# Patient Record
Sex: FEMALE | Race: ASIAN | Employment: FULL TIME | URBAN - METROPOLITAN AREA
[De-identification: names, ages, dates, MRNs, and addresses within clinical notes are randomized per-mention and may not be internally consistent; named-entity substitution may affect disease eponyms.]

---

## 2019-01-17 ENCOUNTER — HOSPITAL ENCOUNTER (OUTPATIENT)
Dept: MAMMOGRAPHY | Age: 44
Discharge: HOME | End: 2019-01-17
Attending: SPECIALIST
Payer: COMMERCIAL

## 2019-01-17 DIAGNOSIS — Z12.39 ENCOUNTER FOR SCREENING FOR MALIGNANT NEOPLASM OF BREAST: ICD-10-CM

## 2019-01-17 PROCEDURE — 77067 SCR MAMMO BI INCL CAD: CPT | Performed by: SPECIALIST

## 2019-01-17 PROCEDURE — 77063 BREAST TOMOSYNTHESIS BI: CPT | Performed by: SPECIALIST

## 2019-02-21 ENCOUNTER — OFFICE VISIT (OUTPATIENT)
Dept: NEUROLOGY | Facility: CLINIC | Age: 44
End: 2019-02-21

## 2019-02-21 VITALS
WEIGHT: 172 LBS | RESPIRATION RATE: 16 BRPM | HEIGHT: 65 IN | DIASTOLIC BLOOD PRESSURE: 74 MMHG | BODY MASS INDEX: 28.66 KG/M2 | HEART RATE: 78 BPM | SYSTOLIC BLOOD PRESSURE: 122 MMHG | OXYGEN SATURATION: 99 %

## 2019-02-21 DIAGNOSIS — R42 DIZZINESS: ICD-10-CM

## 2019-02-21 DIAGNOSIS — M54.2 NECK PAIN ON RIGHT SIDE: Primary | ICD-10-CM

## 2019-02-21 DIAGNOSIS — M62.838 CERVICAL PARASPINAL MUSCLE SPASM: ICD-10-CM

## 2019-02-21 DIAGNOSIS — G44.86 CERVICOGENIC HEADACHE: ICD-10-CM

## 2019-02-21 PROCEDURE — 99244 OFF/OP CNSLTJ NEW/EST MOD 40: CPT | Performed by: OTHER

## 2019-02-21 RX ORDER — ERGOCALCIFEROL 1.25 MG/1
1 CAPSULE ORAL WEEKLY
Refills: 1 | COMMUNITY
Start: 2019-01-24

## 2019-02-21 RX ORDER — CYCLOBENZAPRINE HCL 5 MG
5 TABLET ORAL 3 TIMES DAILY PRN
Qty: 90 TABLET | Refills: 0 | Status: SHIPPED | OUTPATIENT
Start: 2019-02-21

## 2019-02-21 RX ORDER — FLUTICASONE PROPIONATE 50 MCG
1 SPRAY, SUSPENSION (ML) NASAL DAILY
Refills: 0 | COMMUNITY
Start: 2019-02-18

## 2019-02-21 RX ORDER — LOSARTAN POTASSIUM AND HYDROCHLOROTHIAZIDE 12.5; 5 MG/1; MG/1
1 TABLET ORAL DAILY
Refills: 1 | COMMUNITY
Start: 2018-12-13

## 2019-02-21 RX ORDER — ONDANSETRON 4 MG/1
1 TABLET, ORALLY DISINTEGRATING ORAL EVERY 8 HOURS
Refills: 0 | COMMUNITY
Start: 2019-02-18

## 2019-02-21 RX ORDER — MECLIZINE HCL 12.5 MG/1
1 TABLET ORAL 2 TIMES DAILY
Refills: 0 | COMMUNITY
Start: 2019-02-19

## 2019-02-21 RX ORDER — METOPROLOL SUCCINATE 25 MG/1
1 TABLET, EXTENDED RELEASE ORAL DAILY
Refills: 1 | COMMUNITY
Start: 2018-09-15

## 2019-02-21 RX ORDER — LEVOTHYROXINE SODIUM 0.05 MG/1
1 TABLET ORAL DAILY
Refills: 1 | COMMUNITY
Start: 2019-02-09

## 2019-02-21 RX ORDER — AMOXICILLIN 875 MG/1
1 TABLET, COATED ORAL 2 TIMES DAILY
Refills: 0 | COMMUNITY
Start: 2019-02-18 | End: 2019-12-19 | Stop reason: ALTCHOICE

## 2019-02-21 NOTE — PATIENT INSTRUCTIONS
Have MRI / MRA head /neck done at earliest convenience  Start ASA 81 mg daily   For muscle spasm, take Flexeril 5 mg up to 3 time daily as needed - start by taking nightly ; monitor for excessive sedation  Follow up after all testing is done ~ 1 month   Re MUST BE PICKED UP PRIOR TO 3:00PM MONDAY-THURSDAY AND PRIOR TO 1:00PM ON FRIDAY    Scheduling Tests:     If your physician has ordered radiology tests such as MRI or CT scans, do not schedule the test until this office has notified you that the test has bee forms may be submitted. When dropping off forms, please ask the  for this paper. • Failure to follow above steps may result in the delay of form completion.

## 2019-02-21 NOTE — H&P
[facility] New Patient / Consult Visit    Clark Amado is a 53year old female.                          Referring MD: Allan Coleman    Patient presents with:  Consult: right side  head pain, headaches , dizzy, x 2-3 weeks referred by daily. Disp:  Rfl: 1   Levothyroxine Sodium 50 MCG Oral Tab Take 1 tablet by mouth daily. Disp:  Rfl: 1   Fluticasone Propionate 50 MCG/ACT Nasal Suspension 1 spray by Each Nare route daily.  Disp:  Rfl: 0   ergocalciferol 60281 units Oral Cap Take 1 capsul symmetric, eyebrow raise symmetric  Vestibulocochlear:   Hearing: normal bilaterally  Glossopharyngeal/Vagus:   Palate elevates symmetrically with midline uvula  Spinal accessory:   Shoulder Shrug: normal bilaterally   Lateral head turn: normal bilaterally (YTK=67967/14204/83215)        As noted above     (R42) Dizziness  Plan: MRI BRAIN MRA HEAD+MRA NECK (ALL W+WO)         (CPT=70553/35049/21547)        As noted above     (R51) Cervicogenic headache  Plan: as noted above     (R47.192) Cervical paraspinal mu

## 2019-03-25 ENCOUNTER — TELEPHONE (OUTPATIENT)
Dept: NEUROLOGY | Facility: CLINIC | Age: 44
End: 2019-03-25

## 2019-12-19 ENCOUNTER — HOSPITAL ENCOUNTER (EMERGENCY)
Facility: HOSPITAL | Age: 44
Discharge: HOME | End: 2019-12-19
Payer: OTHER MISCELLANEOUS

## 2019-12-19 VITALS
OXYGEN SATURATION: 97 % | WEIGHT: 171.94 LBS | RESPIRATION RATE: 18 BRPM | HEIGHT: 65 IN | DIASTOLIC BLOOD PRESSURE: 93 MMHG | BODY MASS INDEX: 28.65 KG/M2 | HEART RATE: 80 BPM | SYSTOLIC BLOOD PRESSURE: 173 MMHG | TEMPERATURE: 98 F

## 2019-12-19 DIAGNOSIS — S51.811A LACERATION OF RIGHT FOREARM, INITIAL ENCOUNTER: Primary | ICD-10-CM

## 2019-12-19 PROCEDURE — 12001 RPR S/N/AX/GEN/TRNK 2.5CM/<: CPT

## 2019-12-19 PROCEDURE — 99283 EMERGENCY DEPT VISIT LOW MDM: CPT

## 2019-12-19 PROCEDURE — 90471 IMMUNIZATION ADMIN: CPT

## 2019-12-20 NOTE — ED PROVIDER NOTES
Patient Seen in: [facility] Emergency Department      History   Patient presents with:  Laceration Abrasion    Stated Complaint: arm lac     HPI    Jessie Mckenzie is a 40-year-old female who presents today for evaluation of a small laceration to the media normal distal pulses  Pulmonary: Normal respirations, lungs CTA  Musculoskeletal: Right medial wrist with a superficial 1cm linear laceration. ROM of the hand, wrist and elbow are WNL. 5/5  strength. Normal thumb opposition.   Neurological: CN III - X diagnosis)    Disposition:  Discharge  12/19/2019 11:05 pm    Follow-up:  Your occupational health department    In 10 days  For suture removal        Medications Prescribed:  Current Discharge Medication List

## 2019-12-20 NOTE — ED INITIAL ASSESSMENT (HPI)
Laceration to right wrist occurred at 1945, was at work on hologram machine and knife on machine caused laceration.  Last tetanus date unknown

## 2020-01-03 ENCOUNTER — OFFICE VISIT (OUTPATIENT)
Dept: OCCUPATIONAL MEDICINE | Age: 45
End: 2020-01-03
Attending: PHYSICIAN ASSISTANT
Payer: COMMERCIAL

## 2020-01-03 DIAGNOSIS — S51.811A LACERATION OF RIGHT FOREARM WITHOUT COMPLICATION, INITIAL ENCOUNTER: Primary | ICD-10-CM

## 2022-10-08 ENCOUNTER — HOSPITAL ENCOUNTER (OUTPATIENT)
Dept: MAMMOGRAPHY | Age: 47
Discharge: HOME | End: 2022-10-08
Attending: SPECIALIST
Payer: COMMERCIAL

## 2022-10-08 DIAGNOSIS — Z12.31 ENCOUNTER FOR SCREENING MAMMOGRAM FOR MALIGNANT NEOPLASM OF BREAST: ICD-10-CM

## 2022-10-08 PROCEDURE — 77067 SCR MAMMO BI INCL CAD: CPT | Performed by: SPECIALIST

## 2022-10-08 PROCEDURE — 77063 BREAST TOMOSYNTHESIS BI: CPT | Performed by: SPECIALIST

## 2024-04-13 ENCOUNTER — HOSPITAL ENCOUNTER (OUTPATIENT)
Dept: MAMMOGRAPHY | Age: 49
Discharge: HOME | End: 2024-04-13
Attending: SPECIALIST
Payer: COMMERCIAL

## 2024-04-13 DIAGNOSIS — Z12.31 ENCOUNTER FOR SCREENING MAMMOGRAM FOR MALIGNANT NEOPLASM OF BREAST: ICD-10-CM

## 2024-04-13 PROCEDURE — 77067 SCR MAMMO BI INCL CAD: CPT | Performed by: SPECIALIST

## 2024-04-13 PROCEDURE — 77063 BREAST TOMOSYNTHESIS BI: CPT | Performed by: SPECIALIST

## (undated) NOTE — ED AVS SNAPSHOT
Issa Page   MRN: [id number]    Department:  [facility] Emergency Department   Date of Visit:  12/19/2019           Disclosure     Insurance plans vary and the physician(s) referred by the ER may not be covered by your plan.  Please contact your tell this physician (or your personal doctor if your instructions are to return to your personal doctor) about any new or lasting problems. The primary care or specialist physician will see patients referred from the [facility] Emergency Department.  Hina Lopez

## (undated) NOTE — LETTER
02/23/19    Dear Dr. Andrzej Hwang      Thank you for referring your patient, José Miguel Arredondo to me for an evaluation. Please see my initial consult note enclosed below. Let me know if you have any questions.     Thank you  Sean Saha MD, Neurology  E • Heart Disease Father    • Heart Disease Mother        Allergies:  No Known Allergies  Current Meds:    Current Outpatient Medications:  Meclizine HCl 12.5 MG Oral Tab Take 1 tablet by mouth 2 (two) times daily.  Disp:  Rfl: 0   ondansetron 4 MG Oral Table Language: normal naming, repetition, and comprehension  Memory: normal  Attention/concentration: normal    Fundoscopic Exam: optic discs sharp bilaterally    Cranial Nerves: II through XII  Optic:    Pupils: equally round and reactive to light with direct evaluate for secondary etiology with imaging.     In terms of treatment, this may be cervicogenic or tension type headache as well and has some R cervical paraspinal muscle tenderness - will start on cyclobenzaprine 5 mg tid PRN - advised to take as needed